# Patient Record
(demographics unavailable — no encounter records)

---

## 2025-01-09 NOTE — ASSESSMENT
[FreeTextEntry1] : 59 year old female here for annual exam. Routine labs drawn in office. Reviewed gabapentin and plan to increase to 300mg AM and HS. If not improved then also take 100mg at lunch.  Mammogram ordered.  GI referral for colonoscopy.  Reviewed healthy lifestyle habits. All questions answered, patient expressed understanding, and in agreement with plan.

## 2025-01-09 NOTE — HEALTH RISK ASSESSMENT
[Good] : ~his/her~  mood as  good [No] : In the past 12 months have you used drugs other than those required for medical reasons? No [No falls in past year] : Patient reported no falls in the past year [0] : 2) Feeling down, depressed, or hopeless: Not at all (0) [PHQ-2 Negative - No further assessment needed] : PHQ-2 Negative - No further assessment needed [Former] : Former [> 15 Years] : > 15 Years [Patient reported mammogram was normal] : Patient reported mammogram was normal [Patient reported PAP Smear was normal] : Patient reported PAP Smear was normal [Patient reported colonoscopy was normal] : Patient reported colonoscopy was normal [Fully functional (bathing, dressing, toileting, transferring, walking, feeding)] : Fully functional (bathing, dressing, toileting, transferring, walking, feeding) [Fully functional (using the telephone, shopping, preparing meals, housekeeping, doing laundry, using] : Fully functional and needs no help or supervision to perform IADLs (using the telephone, shopping, preparing meals, housekeeping, doing laundry, using transportation, managing medications and managing finances) [0-4] : 0-4 [HIV test declined] : HIV test declined [Hepatitis C test declined] : Hepatitis C test declined [On disability] : on disability [Single] : single [FreeTextEntry1] : None [de-identified] : Aerobic exercises [de-identified] : None [[id number]] : 0 [Change in mental status noted] : No change in mental status noted [Reports changes in hearing] : Reports no changes in hearing [Reports changes in vision] : Reports no changes in vision [MammogramDate] : 11/23 [PapSmearComments] : Unable to remember date [ColonoscopyComments] : Unable to remember date